# Patient Record
Sex: FEMALE | Race: OTHER | HISPANIC OR LATINO | Employment: UNEMPLOYED | URBAN - METROPOLITAN AREA
[De-identification: names, ages, dates, MRNs, and addresses within clinical notes are randomized per-mention and may not be internally consistent; named-entity substitution may affect disease eponyms.]

---
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
## 2024-07-27 ENCOUNTER — OFFICE VISIT (OUTPATIENT)
Dept: URGENT CARE | Age: 19
End: 2024-07-27
Payer: MEDICARE

## 2024-07-27 VITALS
SYSTOLIC BLOOD PRESSURE: 98 MMHG | HEART RATE: 118 BPM | RESPIRATION RATE: 18 BRPM | OXYGEN SATURATION: 98 % | DIASTOLIC BLOOD PRESSURE: 68 MMHG | TEMPERATURE: 102.1 F

## 2024-07-27 DIAGNOSIS — J02.9 SORE THROAT: ICD-10-CM

## 2024-07-27 DIAGNOSIS — J02.0 STREP PHARYNGITIS: Primary | ICD-10-CM

## 2024-07-27 LAB — S PYO AG THROAT QL: POSITIVE

## 2024-07-27 PROCEDURE — 87880 STREP A ASSAY W/OPTIC: CPT

## 2024-07-27 PROCEDURE — 99213 OFFICE O/P EST LOW 20 MIN: CPT

## 2024-07-27 RX ORDER — AMOXICILLIN 500 MG/1
500 CAPSULE ORAL EVERY 12 HOURS SCHEDULED
Qty: 20 CAPSULE | Refills: 0 | Status: SHIPPED | OUTPATIENT
Start: 2024-07-27 | End: 2024-08-06

## 2024-07-27 NOTE — PROGRESS NOTES
[facility]        NAME: Lissa Sarabia is a 18 y.o. female  : 2005    MRN: [id number]  DATE: 2024  TIME: 12:17 PM    Assessment and Plan   Strep pharyngitis [J02.0]  1. Strep pharyngitis  amoxicillin (AMOXIL) 500 mg capsule      2. Sore throat  POCT rapid ANTIGEN strepA      Pt presents for sore throat, cough, body aches and fever- tonsils erythematous, adenopathy. Rapid strep positive.  Took OTC meds PTA      Patient Instructions       Follow up with PCP in 3-5 days.  Proceed to  ER if symptoms worsen.    If tests have been performed at [facility], our office will contact you with results if changes need to be made to the care plan discussed with you at the visit.  [facility]    Chief Complaint     Chief Complaint   Patient presents with    Fever     Patient reports sore throat, fever, bodyaches and bilateral ear pain and chest tightness X 1 day.         History of Present Illness       Pt presents for sore throat, cough, body aches and fever- tonsils erythematous, adenopathy. Rapid strep positive.     Fever  Associated symptoms include coughing, a fever, myalgias and a sore throat.       Review of Systems   Review of Systems   Constitutional:  Positive for activity change and fever.   HENT:  Positive for sore throat.    Respiratory:  Positive for cough.    Musculoskeletal:  Positive for myalgias.   All other systems reviewed and are negative.        Current Medications       Current Outpatient Medications:     amoxicillin (AMOXIL) 500 mg capsule, Take 1 capsule (500 mg total) by mouth every 12 (twelve) hours for 10 days, Disp: 20 capsule, Rfl: 0    albuterol (PROVENTIL HFA,VENTOLIN HFA) 90 mcg/act inhaler, Inhale 2 puffs every 4 (four) hours as needed for wheezing (Patient not taking: Reported on 2021), Disp: 1 Inhaler, Rfl: 0    benzonatate (TESSALON) 200 MG capsule, Take 1 capsule (200 mg total) by mouth 3 (three) times a day as needed for  cough (Patient not taking: Reported on 7/27/2024), Disp: 20 capsule, Rfl: 0    cetirizine (ZyrTEC) 10 mg tablet, Take 1 tablet (10 mg total) by mouth daily, Disp: 30 tablet, Rfl: 5    fluticasone (FLONASE) 50 mcg/act nasal spray, 1 spray into each nostril daily (Patient not taking: Reported on 11/25/2020), Disp: 16 g, Rfl: 0    loratadine-pseudoephedrine (CLARITIN-D 24-HOUR)  mg per 24 hr tablet, Take 1 tablet by mouth daily (Patient not taking: Reported on 11/25/2020), Disp: 10 tablet, Rfl: 0    Current Allergies     Allergies as of 07/27/2024    (No Known Allergies)            The following portions of the patient's history were reviewed and updated as appropriate: allergies, current medications, past family history, past medical history, past social history, past surgical history and problem list.     Past Medical History:   Diagnosis Date    Allergic        No past surgical history on file.    Family History   Problem Relation Age of Onset    Mental illness Mother     Mental illness Father          Medications have been verified.        Objective   BP 98/68   Pulse (!) 118   Temp (!) 102.1 °F (38.9 °C)   Resp 18   SpO2 98%   No LMP recorded.       Physical Exam     Physical Exam  Vitals reviewed.   Constitutional:       Appearance: Normal appearance.   HENT:      Mouth/Throat:      Pharynx: Posterior oropharyngeal erythema present.   Cardiovascular:      Rate and Rhythm: Regular rhythm. Tachycardia present.   Pulmonary:      Effort: Pulmonary effort is normal.      Breath sounds: Normal breath sounds.   Musculoskeletal:         General: Normal range of motion.   Lymphadenopathy:      Cervical: Cervical adenopathy present.   Skin:     General: Skin is warm and dry.   Neurological:      General: No focal deficit present.      Mental Status: She is alert.

## 2024-08-27 ENCOUNTER — OFFICE VISIT (OUTPATIENT)
Dept: OBGYN CLINIC | Facility: CLINIC | Age: 19
End: 2024-08-27

## 2024-08-27 VITALS
HEIGHT: 59 IN | BODY MASS INDEX: 19.88 KG/M2 | WEIGHT: 98.6 LBS | DIASTOLIC BLOOD PRESSURE: 60 MMHG | SYSTOLIC BLOOD PRESSURE: 98 MMHG

## 2024-08-27 DIAGNOSIS — Z11.3 SCREENING EXAMINATION FOR STD (SEXUALLY TRANSMITTED DISEASE): ICD-10-CM

## 2024-08-27 DIAGNOSIS — Z01.419 ENCOUNTER FOR GYNECOLOGICAL EXAMINATION WITHOUT ABNORMAL FINDING: Primary | ICD-10-CM

## 2024-08-27 PROCEDURE — 99385 PREV VISIT NEW AGE 18-39: CPT | Performed by: NURSE PRACTITIONER

## 2024-08-27 PROCEDURE — 87591 N.GONORRHOEAE DNA AMP PROB: CPT | Performed by: NURSE PRACTITIONER

## 2024-08-27 PROCEDURE — 87491 CHLMYD TRACH DNA AMP PROBE: CPT | Performed by: NURSE PRACTITIONER

## 2024-08-27 NOTE — PATIENT INSTRUCTIONS
Patient Education     Calcium Rich Diet   About this topic   Calcium is one of the most important minerals and is found in almost all parts of your body. It makes your teeth and bones strong and healthy. Your body does not make calcium. It is important for you to eat calcium rich foods to get enough calcium. It also helps your body:  Make blood clots  Keep your heartbeat normal  Helps blood move throughout the body  Release hormones  Release enzymes  Send and get signals between your nerves and brain  Make muscles work well         What will the results be?   It is important to have a good amount of calcium in your food. Calcium helps your body work well. It is very important during every life stage. Calcium may also keep you from losing bone mass. This is osteopenia. It may help keep you from having weak bones. This is osteoporosis.  What drugs may be needed?   The doctor may order calcium and vitamin D supplements. You need vitamin D to help your body take in the calcium. Your calcium supplement may have vitamin D in it.  Talk to your doctor about:  If it is OK to take your calcium with food.  How often to take your calcium supplement throughout the day.  All the drugs you are taking. Be sure to include all prescription and over-the-counter (OTC) drugs, and herbal supplements. Tell the doctor about any drug allergy. Bring a list of drugs you take with you. Some drugs may cause problems with how your body takes in calcium.  Will physical activity be limited?   No, physical activities will not be limited. It is good for you to do light exercises and to stay active.  What changes to diet are needed?   You will need to watch how much calcium is in the foods you eat. Your doctor will talk to you about the right amount of calcium for you. How much calcium you need is based on your age and life stage.  When is this diet used?   This diet is used when your normal diet is low in calcium. It is needed to raise the amount of  calcium in your diet. Our bodies do not take in calcium well as we get older.  Who should not use this diet?   People with too much calcium in their blood should not use this diet. This is called hypercalcemia.  What foods are good to eat?   Milk, yogurt, and cheese products are naturally high in calcium.  These foods have smaller amounts of calcium. If they are eaten often and in large amounts they can be good sources of calcium:  Oysters; dried fruit like figs and raisins; green leafy vegetables like broccoli, collards, kale, mustard greens, bok tawny; soy beans; oranges  [place] and sardines. Be sure to eat the soft bones.  Nuts like almonds and [place] nuts, sunflower seeds, tahini, dried beans  Food products with calcium added to them like orange juice, tofu, cereal, bread; almond milk, rice milk, soy milk  What foods should be limited or avoided?   People who eat many kinds of foods do not have to be worried about limiting or avoiding certain foods.   What problems could happen?   Some people may get kidney stones. This may happen after taking high amounts of calcium over a long time. Calcium from food does not seem to cause kidney stones.  Hard stools.  Too much calcium may interfere with your body’s ability to absorb iron and zinc.  When do I need to call the doctor?   Call your doctor if you have concerns about your diet. Tell your doctor if you are allergic to any of the foods in your diet.  Helpful tips   If you have problems digesting milk, try lactose-free milk. You may also use a product to help you take in lactose.  Talk with your doctor if you are a vegetarian and do not eat dairy products. Your doctor can help you make sure you get the amount of calcium your body needs.  Last Reviewed Date   2021-03-12  Consumer Information Use and Disclaimer   This generalized information is a limited summary of diagnosis, treatment, and/or medication information. It is not meant to be comprehensive and should be used  as a tool to help the user understand and/or assess potential diagnostic and treatment options. It does NOT include all information about conditions, treatments, medications, side effects, or risks that may apply to a specific patient. It is not intended to be medical advice or a substitute for the medical advice, diagnosis, or treatment of a health care provider based on the health care provider's examination and assessment of a patient’s specific and unique circumstances. Patients must speak with a health care provider for complete information about their health, medical questions, and treatment options, including any risks or benefits regarding use of medications. This information does not endorse any treatments or medications as safe, effective, or approved for treating a specific patient. UpToDate, Inc. and its affiliates disclaim any warranty or liability relating to this information or the use thereof. The use of this information is governed by the Terms of Use, available at https://www.[employer]/en/know/clinical-effectiveness-terms   Copyright   Copyright © 2024 UpToDate, Inc. and its affiliates and/or licensors. All rights reserved.  Patient Education     Exercise and movement   The Basics   Written by the doctors and editors at [employer]   What are the benefits of movement? -- Moving your body has many benefits. It can:   Burn calories, which helps people manage their weight   Help control blood sugar levels in people with diabetes   Lower blood pressure, especially in people with high blood pressure   Lower stress, and help with depression and anxiety   Keep bones strong, so they don't get thin and break easily   Lower the chance of dying from heart disease  Adding even small amounts of physical activity to your daily routine can improve your health.  What are the main types of exercise? -- There are 3 main types of exercise:   Aerobic exercise - This raises your heart rate. Examples include  walking, running, dancing, riding a bike, and swimming.   Muscle strengthening - This helps make your muscles stronger. You can do it using weights, exercise bands, or weight machines. You can also use your own body weight, as with push-ups, or by lifting items in your home, like jugs of water.   Stretching - These help your muscles and joints move more easily.  It's important to have all 3 types in your exercise program. That way, your body, muscles, and joints can be as healthy as possible.  Should I talk to my doctor or nurse before I start exercising? -- If you have not exercised before or have not exercised in a long time, talk with your doctor or nurse before you start a very active exercise program.  If you have heart disease or risk factors for heart disease (like high blood pressure or diabetes), your doctor or nurse might recommend that you have an exercise test before starting an exercise program.  When you begin an exercise program, start slowly. For example, do the exercise at a slow pace or for a few minutes only. Over time, you can exercise faster and for longer periods of time.  What should I do when I exercise? -- Each time you exercise, you should:   Warm up - This can help keep you from hurting your muscles when you exercise. To warm up, do a light aerobic exercise (such as walking slowly) or stretch for 5 to 10 minutes.   Work out - Try to get a mix of aerobic exercise, muscle strengthening, and stretching. During an aerobic workout, you can walk fast, swim, run, or use an exercise machine, for example. Other activities, like dancing or playing tennis, are also forms of aerobic exercise. You should also take time to stretch all of your joints, including your neck, shoulders, back, hips, and knees. At least 2 times a week, you can do muscle strengthening exercises as part of your workout.   Cool down - This helps keep you from feeling dizzy after you exercise and helps prevent muscle cramps. To  cool down, you can stretch or do a light aerobic exercise for 5 minutes.  Some people go to a gym or do group exercise classes. But you can exercise even without these things. Some exercises can be done even in a small space. You can also try online videos or smartphone apps to get ideas for different types of exercise.  How often should I exercise? -- Doctors recommend that people exercise at least 30 minutes a day, on 5 or more days of the week.  If you can't exercise for 30 minutes straight, try to exercise for 10 minutes at a time, 3 or 4 times a day. Even exercising for shorter amounts of time is good for you, especially if it means spending less time sitting.  When should I call my doctor or nurse? -- If you have any of the following symptoms when you exercise, stop exercising and call your doctor or nurse right away:   Pain or pressure in your chest, arms, throat, jaw, or back   Nausea or vomiting   Feeling like your heart is fluttering or racing very fast   Feeling dizzy or faint  What if I don't have time to exercise? -- Many people have very busy lives and might not think that they have time to exercise. But it's important to try to find time, even if you are tired or work a lot. Exercise can increase your energy level, which can make you feel better and might even help you get more work done.  Even if it's hard to set aside a lot of time to exercise, you can still improve your health by moving your body more. There are many ways that you can be more active. For example, you can:   Take the stairs instead of the elevator.   Park in a parking space that is farther away from the door.   Take a longer route when you walk from one place to another.  Spending a lot of time sitting still (for example, watching TV or working on the computer) is bad for your health. Try to get up and move around whenever you can. Even small amounts of movement, like taking short walks, doing household chores, or gardening, can  improve your health. Finding activities that you enjoy, or doing them with other people, can help you add more movement into your daily life.  What else should I do when I exercise? -- To exercise safely and avoid problems, it's important to:   Drink fluids during and after exercising (but avoid drinks with a lot of caffeine or sugar).   Avoid exercising outside if it is too hot or cold.   Wear layers of clothes, so that you can take them off if you get too hot.   Wear shoes that fit well and support your feet.   Be aware of your surroundings if you exercise outside.  All topics are updated as new evidence becomes available and our peer review process is complete.  This topic retrieved from [employer] on: May 18, 2024.  Topic 21835 Version 31.0  Release: 32.4.3 - C32.137  © 2024 UpToDate, Inc. and/or its affiliates. All rights reserved.  Consumer Information Use and Disclaimer   Disclaimer: This generalized information is a limited summary of diagnosis, treatment, and/or medication information. It is not meant to be comprehensive and should be used as a tool to help the user understand and/or assess potential diagnostic and treatment options. It does NOT include all information about conditions, treatments, medications, side effects, or risks that may apply to a specific patient. It is not intended to be medical advice or a substitute for the medical advice, diagnosis, or treatment of a health care provider based on the health care provider's examination and assessment of a patient's specific and unique circumstances. Patients must speak with a health care provider for complete information about their health, medical questions, and treatment options, including any risks or benefits regarding use of medications. This information does not endorse any treatments or medications as safe, effective, or approved for treating a specific patient. UpToDate, Inc. and its affiliates disclaim any warranty or liability relating to  this information or the use thereof.The use of this information is governed by the Terms of Use, available at https://www.[employer].com/en/know/clinical-effectiveness-terms. 2024© UpToDate, Inc. and its affiliates and/or licensors. All rights reserved.  Copyright   © 2024 [employer] and/or its affiliates. All rights reserved.

## 2024-08-27 NOTE — PROGRESS NOTES
Assessment / Plan    1. Encounter for gynecological examination without abnormal finding  Normal first gyn exam.  Discussed benefit of gardasil vaccination.  Initiate pap smear screening at age 20.  Discussed contraception.  She declines at this time.    2. Screening examination for STD (sexually transmitted disease)    - Chlamydia/GC amplified DNA by PCR        Subjective      Lissa Sarabia is a 18 y.o. female who presents for her annual gynecologic exam.    NEW PATIENT  17 yo G0  Presents for her 1st GYN exam.    Last pap: n/a  Pap hx: n/a  STD screening: none on record  STD hx: none  Sexually active: yes, 1 lifetime partner, 3 yr relationship  Condom use: no  Gardasil vaccine: counseled re: benefit  Nutrition: bmi 19.9  Calcium intake: given guidelines  Exercise: no; given guidelines  Safety: feels safe    Periods are regular  Current contraception: none; does not desire any.  History of abnormal Pap smear: no  Family history of breast,uterine, ovarian or colon cancer: no    Menstrual History:  OB History          0    Para   0    Term   0       0    AB   0    Living   0         SAB   0    IAB   0    Ectopic   0    Multiple   0    Live Births   0           Obstetric Comments   Menarche 11  Cycles: monthly, Q 30d, x 4+ days, medium flow; + cramps which are manageable.                  Patient's last menstrual period was 2024 (within days).       The following portions of the patient's history were reviewed and updated as appropriate: allergies, current medications, past family history, past medical history, past social history, past surgical history, and problem list.    Review of Systems      Review of Systems   Constitutional:  Negative for chills and fever.   Respiratory:  Negative for cough and shortness of breath.    Gastrointestinal:  Negative for abdominal distention, abdominal pain, blood in stool, constipation, diarrhea, nausea and vomiting.   Genitourinary:  Negative for difficulty  "urinating, dysuria, frequency, genital sores, hematuria, menstrual problem, pelvic pain, urgency, vaginal bleeding and vaginal discharge.   Musculoskeletal:  Negative for arthralgias and myalgias.     Breasts:  Negative for skin changes, dimpling, asymmetry, nipple discharge, redness, tenderness or palpable masses    Objective     Chaperone present: Kim Chacon MA     BP 98/60 (BP Location: Right arm, Patient Position: Sitting, Cuff Size: Standard)   Ht 4' 11\" (1.499 m)   Wt 44.7 kg (98 lb 9.6 oz)   LMP 08/20/2024 (Within Days)   BMI 19.91 kg/m²   Physical Exam  Constitutional:       General: She is not in acute distress.     Appearance: Normal appearance. She is well-developed and normal weight. She is not ill-appearing or diaphoretic.   HENT:      Head: Normocephalic and atraumatic.   Eyes:      Pupils: Pupils are equal, round, and reactive to light.   Neck:      Thyroid: No thyromegaly.   Pulmonary:      Effort: Pulmonary effort is normal.   Chest:   Breasts:     Breasts are symmetrical.      Right: No inverted nipple, mass, nipple discharge, skin change or tenderness.      Left: No inverted nipple, mass, nipple discharge, skin change or tenderness.   Abdominal:      General: There is no distension.      Palpations: Abdomen is soft. There is no mass.      Tenderness: There is no abdominal tenderness. There is no guarding or rebound.   Genitourinary:     General: Normal vulva.      Exam position: Lithotomy position.      Labia:         Right: No rash, tenderness, lesion or injury.         Left: No rash, tenderness, lesion or injury.       Vagina: No signs of injury and foreign body. No vaginal discharge, erythema, tenderness or bleeding.      Cervix: No cervical motion tenderness, discharge or friability.      Uterus: Not enlarged and not tender.       Adnexa:         Right: No mass or tenderness.          Left: No mass or tenderness.     Musculoskeletal:      Cervical back: Neck supple.   Lymphadenopathy: "      Cervical: No cervical adenopathy.      Upper Body:      Right upper body: No supraclavicular adenopathy.      Left upper body: No supraclavicular adenopathy.   Skin:     General: Skin is warm and dry.   Neurological:      General: No focal deficit present.      Mental Status: She is alert and oriented to person, place, and time.   Psychiatric:         Mood and Affect: Mood normal.         Behavior: Behavior normal.         Thought Content: Thought content normal.         Judgment: Judgment normal.

## 2024-08-28 LAB
C TRACH DNA SPEC QL NAA+PROBE: POSITIVE
N GONORRHOEA DNA SPEC QL NAA+PROBE: NEGATIVE

## 2024-08-29 ENCOUNTER — TELEPHONE (OUTPATIENT)
Age: 19
End: 2024-08-29

## 2024-08-29 DIAGNOSIS — A74.9 CHLAMYDIA INFECTION: Primary | ICD-10-CM

## 2024-08-29 DIAGNOSIS — A74.9 CHLAMYDIA INFECTION: ICD-10-CM

## 2024-08-29 RX ORDER — AZITHROMYCIN 500 MG/1
TABLET, FILM COATED ORAL
Qty: 2 TABLET | Refills: 0 | Status: SHIPPED | OUTPATIENT
Start: 2024-08-29 | End: 2024-08-29

## 2024-08-29 NOTE — TELEPHONE ENCOUNTER
No HIPAA on file- few mobile #'s in chart- left voice message on the *[phone] # to have patient call back to discuss results.    There are a lot of openings in Oct for [facility]

## 2024-08-29 NOTE — TELEPHONE ENCOUNTER
----- Message from BYRON Nava sent at 8/29/2024  7:11 AM EDT -----  Please inform patient that her chlamydia culture was POSITIVE.  Explain to her that this is a sexually transmitted infection and that she needs treatment as soon as possible.    She also needs to inform her partner(s) who need treatment as soon as possible as well.  I will send a prescription for azithromycin one gram to her pharmacy.  After taking the antibiotic, she needs to not have sex for at least one week post treatment of BOTH her and her partner(s).  In future she needs to use condoms all the time for sexually transmitted infection protection.    Return visit 2 months for test of cure.

## 2024-08-29 NOTE — RESULT ENCOUNTER NOTE
Please inform patient that her chlamydia culture was POSITIVE.  Explain to her that this is a sexually transmitted infection and that she needs treatment as soon as possible.    She also needs to inform her partner(s) who need treatment as soon as possible as well.  I will send a prescription for azithromycin one gram to her pharmacy.  After taking the antibiotic, she needs to not have sex for at least one week post treatment of BOTH her and her partner(s).  In future she needs to use condoms all the time for sexually transmitted infection protection.    Return visit 2 months for test of cure.

## 2024-09-04 RX ORDER — AZITHROMYCIN 500 MG/1
TABLET, FILM COATED ORAL
Qty: 2 TABLET | Refills: 0 | Status: SHIPPED | OUTPATIENT
Start: 2024-09-04 | End: 2024-09-04

## 2024-09-04 NOTE — TELEPHONE ENCOUNTER
Patient called back and results reviewed. She asked if prescription could be sent to [facility] ( pharmacy changed in [facility]) patient verbalized understanding of follow up instructions and appointment scheduled for 2 month follow up.

## 2025-04-30 ENCOUNTER — TELEPHONE (OUTPATIENT)
Age: 20
End: 2025-04-30

## 2025-04-30 NOTE — TELEPHONE ENCOUNTER
Patient with unknown LMP had 3 positive home pregnancy tests this weekend. States she may have had menses middle of last month, but unsure. Patient states she took plan B Monday or Tuesday with hopes of terminating pregnancy, but did not bleed as she expected to. Informed patient plan B does not work in terminating pregnancy. Recommended she follow-up with termination clinic at [facility] or [facility]. Patient states if she is too far along in pregnancy, she will keep and plans to call us back if needed.     Patient also states she needs follow-up scheduled from last appointment. She was seen 8/27/24 and was advised test of cure appointment for positive chlamydia result. Follow-up appointment scheduled.

## 2025-05-07 ENCOUNTER — TELEPHONE (OUTPATIENT)
Dept: OTHER | Facility: OTHER | Age: 20
End: 2025-05-07

## 2025-05-07 NOTE — TELEPHONE ENCOUNTER
Patient is calling regarding cancelling an appointment.    Date/Time: 5/7 0815    Patient was rescheduled: YES [] NO [x]    Patient requesting call back to reschedule: YES [x] NO []

## 2025-07-16 ENCOUNTER — APPOINTMENT (OUTPATIENT)
Dept: LAB | Facility: CLINIC | Age: 20
End: 2025-07-16
Payer: MEDICARE

## 2025-07-16 ENCOUNTER — OFFICE VISIT (OUTPATIENT)
Dept: FAMILY MEDICINE CLINIC | Facility: CLINIC | Age: 20
End: 2025-07-16

## 2025-07-16 VITALS
HEART RATE: 61 BPM | OXYGEN SATURATION: 98 % | SYSTOLIC BLOOD PRESSURE: 108 MMHG | TEMPERATURE: 97.6 F | HEIGHT: 59 IN | DIASTOLIC BLOOD PRESSURE: 74 MMHG | WEIGHT: 85.2 LBS | BODY MASS INDEX: 17.18 KG/M2 | RESPIRATION RATE: 18 BRPM

## 2025-07-16 DIAGNOSIS — Z00.00 ENCOUNTER FOR GENERAL HEALTH EXAMINATION: Primary | ICD-10-CM

## 2025-07-16 DIAGNOSIS — Z00.00 ENCOUNTER FOR GENERAL HEALTH EXAMINATION: ICD-10-CM

## 2025-07-16 LAB
BASOPHILS # BLD AUTO: 0.03 THOUSANDS/ÂΜL (ref 0–0.1)
BASOPHILS NFR BLD AUTO: 0 % (ref 0–1)
EOSINOPHIL # BLD AUTO: 0.63 THOUSAND/ÂΜL (ref 0–0.61)
EOSINOPHIL NFR BLD AUTO: 9 % (ref 0–6)
ERYTHROCYTE [DISTWIDTH] IN BLOOD BY AUTOMATED COUNT: 12.4 % (ref 11.6–15.1)
HCT VFR BLD AUTO: 40.3 % (ref 34.8–46.1)
HGB BLD-MCNC: 13.3 G/DL (ref 11.5–15.4)
IMM GRANULOCYTES # BLD AUTO: 0.03 THOUSAND/UL (ref 0–0.2)
IMM GRANULOCYTES NFR BLD AUTO: 0 % (ref 0–2)
IRON SATN MFR SERPL: 6 % (ref 15–50)
IRON SERPL-MCNC: 23 UG/DL (ref 50–212)
LYMPHOCYTES # BLD AUTO: 2.09 THOUSANDS/ÂΜL (ref 0.6–4.47)
LYMPHOCYTES NFR BLD AUTO: 30 % (ref 14–44)
MCH RBC QN AUTO: 29.2 PG (ref 26.8–34.3)
MCHC RBC AUTO-ENTMCNC: 33 G/DL (ref 31.4–37.4)
MCV RBC AUTO: 89 FL (ref 82–98)
MONOCYTES # BLD AUTO: 0.36 THOUSAND/ÂΜL (ref 0.17–1.22)
MONOCYTES NFR BLD AUTO: 5 % (ref 4–12)
NEUTROPHILS # BLD AUTO: 3.91 THOUSANDS/ÂΜL (ref 1.85–7.62)
NEUTS SEG NFR BLD AUTO: 56 % (ref 43–75)
NRBC BLD AUTO-RTO: 0 /100 WBCS
PLATELET # BLD AUTO: 287 THOUSANDS/UL (ref 149–390)
PMV BLD AUTO: 10.7 FL (ref 8.9–12.7)
RBC # BLD AUTO: 4.55 MILLION/UL (ref 3.81–5.12)
TIBC SERPL-MCNC: 354.2 UG/DL (ref 250–450)
TRANSFERRIN SERPL-MCNC: 253 MG/DL (ref 203–362)
UIBC SERPL-MCNC: 331 UG/DL (ref 155–355)
WBC # BLD AUTO: 7.05 THOUSAND/UL (ref 4.31–10.16)

## 2025-07-16 PROCEDURE — 87591 N.GONORRHOEAE DNA AMP PROB: CPT

## 2025-07-16 PROCEDURE — 86803 HEPATITIS C AB TEST: CPT

## 2025-07-16 PROCEDURE — 83550 IRON BINDING TEST: CPT

## 2025-07-16 PROCEDURE — 85025 COMPLETE CBC W/AUTO DIFF WBC: CPT

## 2025-07-16 PROCEDURE — 87661 TRICHOMONAS VAGINALIS AMPLIF: CPT

## 2025-07-16 PROCEDURE — 87389 HIV-1 AG W/HIV-1&-2 AB AG IA: CPT

## 2025-07-16 PROCEDURE — 36415 COLL VENOUS BLD VENIPUNCTURE: CPT

## 2025-07-16 PROCEDURE — 87563 M. GENITALIUM AMP PROBE: CPT

## 2025-07-16 PROCEDURE — 87491 CHLMYD TRACH DNA AMP PROBE: CPT

## 2025-07-16 PROCEDURE — 83540 ASSAY OF IRON: CPT

## 2025-07-16 NOTE — PROGRESS NOTES
Name: Lissa Sarabia      : 2005      MRN: [id number]  Encounter Provider: Kiersten Vaughn MD  Encounter Date: 2025   Encounter department: [facility]  :  Assessment & Plan  Encounter for general health examination  Diet: No fast foods, often freezer foods and frequent sweet tooth. Minimal fruits and veggies.   Exercise: Runs daily. No strength training.   Drugs/substance: Daily weed smoker. Smokes marijuana flower w/tobacco.   Sexual history: Unprotected sex w/single partner for ~1.5 years. Single episode of chlamydia which was treated. No HPV vaccination on file.  Overdue per USPSTF guidelines for HIV and Hep C screening.    Menses: Intermittent heavy bleeding.     Plan:   Nutrition counseling: Incorporate more protein into diet & avoid freezer foods. Counseled on the importance of a whole food diet including whole wheat, fiber, fruits, lean meats vegetables, low fat, and low sugar diet daily. Diet and health handout provided in AVS. Diabetic diet illustration provided in clinic. Will check vitamin D levels.   Exercise counseling: Continue daily cardiovascular exercise. Incorporate strength training into exercise regimen.   Safe sex counseling: Advised barrier protection. STI check. Patient to schedule first pap smear with her OBGYN.   Smoking cessation counseling for tobacco and marijuana.   Vaccinations: Recommended HPV vaccination, patient to educate herself regarding efficacy and safety of vaccine. Will discuss at ext visit.   Menses: CBC and iron panel.       Orders:    HIV 1/2 AB/AG w Reflex [facility] for 2 yr old and above; Future    Hepatitis C antibody; Future    Chlamydia/GC amplified DNA by PCR; Future    Trichomonas vaginalis/Mycoplasma genitalium PCR; Future    TIBC Panel (incl. Iron, TIBC, % Iron Saturation); Future    CBC and differential; Future    Vitamin D 25 hydroxy; Future    TSH + Free T4; Future    BMI < 18.5  Patient endorses minimal appetite.  "    Plan:   Protein supplementation as above.   TSH                 History of Present Illness   19 year old female w/o signifcant PMH presents to the clinic to establish care and general check.       Review of Systems   Constitutional:  Negative for chills and fever.   HENT:  Negative for ear pain and sore throat.    Eyes:  Negative for pain and visual disturbance.   Respiratory:  Negative for cough and shortness of breath.    Cardiovascular:  Negative for chest pain, palpitations and leg swelling.   Gastrointestinal:  Positive for nausea (occasional) and vomiting (occasional). Negative for abdominal pain and diarrhea.   Genitourinary:  Negative for dysuria and hematuria.        Intermittent heavy menses.    Musculoskeletal:  Negative for arthralgias and back pain.   Skin:  Negative for color change and rash.   Neurological:  Negative for dizziness, seizures, syncope, light-headedness, numbness and headaches.   All other systems reviewed and are negative.      Objective   /74 (BP Location: Right arm, Patient Position: Sitting, Cuff Size: Standard)   Pulse 61   Temp 97.6 °F (36.4 °C) (Temporal)   Resp 18   Ht 4' 11\" (1.499 m)   Wt 38.6 kg (85 lb 3.2 oz)   LMP 07/01/2025 (Approximate)   SpO2 98%   BMI 17.21 kg/m²      Physical Exam  Vitals and nursing note reviewed.   Constitutional:       General: She is not in acute distress.     Appearance: She is not ill-appearing or toxic-appearing.   HENT:      Head: Normocephalic and atraumatic.      Right Ear: External ear normal.      Left Ear: External ear normal.      Nose: Nose normal.      Mouth/Throat:      Mouth: Mucous membranes are moist.     Eyes:      General: No scleral icterus.        Right eye: No discharge.         Left eye: No discharge.      Extraocular Movements: Extraocular movements intact.      Conjunctiva/sclera: Conjunctivae normal.       Cardiovascular:      Rate and Rhythm: Normal rate and regular rhythm.      Pulses: Normal pulses.      " Heart sounds: Normal heart sounds. No murmur heard.  Pulmonary:      Effort: Pulmonary effort is normal. No respiratory distress.      Breath sounds: Normal breath sounds. No wheezing, rhonchi or rales.   Chest:      Chest wall: No tenderness.   Abdominal:      General: There is no distension.      Palpations: Abdomen is soft.      Tenderness: There is no abdominal tenderness. There is no guarding or rebound.     Musculoskeletal:      Right lower leg: No edema.      Left lower leg: No edema.     Neurological:      Mental Status: She is alert.

## 2025-07-16 NOTE — ASSESSMENT & PLAN NOTE
Diet: No fast foods, often freezer foods and frequent sweet tooth. Minimal fruits and veggies.   Exercise: Runs daily. No strength training.   Drugs/substance: Daily weed smoker. Smokes marijuana flower w/tobacco.   Sexual history: Unprotected sex w/single partner for ~1.5 years. Single episode of chlamydia which was treated. No HPV vaccination on file.  Overdue per USPSTF guidelines for HIV and Hep C screening.    Menses: Intermittent heavy bleeding.     Plan:   Nutrition counseling: Incorporate more protein into diet & avoid freezer foods. Counseled on the importance of a whole food diet including whole wheat, fiber, fruits, lean meats vegetables, low fat, and low sugar diet daily. Diet and health handout provided in AVS. Diabetic diet illustration provided in clinic. Will check vitamin D levels.   Exercise counseling: Continue daily cardiovascular exercise. Incorporate strength training into exercise regimen.   Safe sex counseling: Advised barrier protection. STI check. Patient to schedule first pap smear with her OBGYN.   Smoking cessation counseling for tobacco and marijuana.   Vaccinations: Recommended HPV vaccination, patient to educate herself regarding efficacy and safety of vaccine. Will discuss at ext visit.   Menses: CBC and iron panel.       Orders:    HIV 1/2 AB/AG w Reflex [facility] for 2 yr old and above; Future    Hepatitis C antibody; Future    Chlamydia/GC amplified DNA by PCR; Future    Trichomonas vaginalis/Mycoplasma genitalium PCR; Future    TIBC Panel (incl. Iron, TIBC, % Iron Saturation); Future    CBC and differential; Future    Vitamin D 25 hydroxy; Future    TSH + Free T4; Future

## 2025-07-17 LAB
C TRACH DNA SPEC QL NAA+PROBE: NEGATIVE
HCV AB SER QL: NORMAL
HIV 1+2 AB+HIV1 P24 AG SERPL QL IA: NORMAL
M GENITALIUM DNA SPEC QL NAA+PROBE: POSITIVE
N GONORRHOEA DNA SPEC QL NAA+PROBE: NEGATIVE
T VAGINALIS DNA SPEC QL NAA+PROBE: NEGATIVE

## 2025-07-21 ENCOUNTER — TELEPHONE (OUTPATIENT)
Dept: FAMILY MEDICINE CLINIC | Facility: CLINIC | Age: 20
End: 2025-07-21

## 2025-07-21 DIAGNOSIS — A49.3 INFECTION DUE TO MYCOPLASMA GENITALIUM: Primary | ICD-10-CM

## 2025-07-21 RX ORDER — AZITHROMYCIN 250 MG/1
500 TABLET, FILM COATED ORAL EVERY 24 HOURS
Qty: 6 TABLET | Refills: 0 | Status: SHIPPED | OUTPATIENT
Start: 2025-07-21 | End: 2025-07-24

## 2025-07-21 RX ORDER — FERROUS SULFATE 324(65)MG
324 TABLET, DELAYED RELEASE (ENTERIC COATED) ORAL EVERY OTHER DAY
Qty: 15 TABLET | Refills: 3 | Status: SHIPPED | OUTPATIENT
Start: 2025-07-21 | End: 2025-07-21

## 2025-07-21 RX ORDER — DOXYCYCLINE HYCLATE 100 MG
100 TABLET ORAL 2 TIMES DAILY
Qty: 14 TABLET | Refills: 0 | Status: SHIPPED | OUTPATIENT
Start: 2025-07-21 | End: 2025-07-21

## 2025-07-21 RX ORDER — AZITHROMYCIN 250 MG/1
1000 TABLET, FILM COATED ORAL ONCE
Qty: 4 TABLET | Refills: 0 | Status: SHIPPED | OUTPATIENT
Start: 2025-07-21 | End: 2025-07-21

## 2025-07-21 RX ORDER — AZITHROMYCIN 250 MG/1
500 TABLET, FILM COATED ORAL EVERY 24 HOURS
Qty: 6 TABLET | Refills: 0 | Status: SHIPPED | OUTPATIENT
Start: 2025-07-21 | End: 2025-07-21

## 2025-07-21 RX ORDER — FERROUS SULFATE 324(65)MG
324 TABLET, DELAYED RELEASE (ENTERIC COATED) ORAL EVERY OTHER DAY
Qty: 15 TABLET | Refills: 3 | Status: SHIPPED | OUTPATIENT
Start: 2025-07-21

## 2025-07-21 RX ORDER — DOXYCYCLINE HYCLATE 100 MG
100 TABLET ORAL 2 TIMES DAILY
Qty: 14 TABLET | Refills: 0 | Status: SHIPPED | OUTPATIENT
Start: 2025-07-21 | End: 2025-07-28

## 2025-07-21 NOTE — TELEPHONE ENCOUNTER
Patient called this morning regarding her test results. She wanted to speak with PCP but had to go into work. Patient scheduled virtual visit to speak with another provider about results at 1pm today.

## 2025-07-21 NOTE — TELEPHONE ENCOUNTER
Recent workup demonstrated:   Low iron   Positive for mycoplasma genitalium     Plan:   Iron supplementation   Doxycycline 100 mg PO twice daily for 7 days, followed by azithromycin 1000 mg PO once on 8th day, followed by azithromycin 500 mg PO once daily for 3 more days     Two attempts to call patient via [employer] without success.   Third attempt to call patient successful, patient amenable to treatment plan. All questions answered.

## 2025-07-21 NOTE — ASSESSMENT & PLAN NOTE
Tested positive    Plan:   Doxycycline 100 mg PO twice daily for 7 days, followed by azithromycin 1000 mg PO once, followed by azithromycin 500 mg PO once daily for 3 days